# Patient Record
Sex: MALE | Race: WHITE | Employment: FULL TIME | URBAN - METROPOLITAN AREA
[De-identification: names, ages, dates, MRNs, and addresses within clinical notes are randomized per-mention and may not be internally consistent; named-entity substitution may affect disease eponyms.]

---
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
## 2019-12-12 ENCOUNTER — OFFICE VISIT (OUTPATIENT)
Dept: FAMILY MEDICINE CLINIC | Age: 19
End: 2019-12-12
Payer: COMMERCIAL

## 2019-12-12 VITALS
HEART RATE: 73 BPM | TEMPERATURE: 98.2 F | OXYGEN SATURATION: 99 % | SYSTOLIC BLOOD PRESSURE: 126 MMHG | WEIGHT: 133 LBS | DIASTOLIC BLOOD PRESSURE: 81 MMHG | HEIGHT: 71 IN | BODY MASS INDEX: 18.62 KG/M2

## 2019-12-12 DIAGNOSIS — J06.9 ACUTE URI: Primary | ICD-10-CM

## 2019-12-12 DIAGNOSIS — J20.9 ACUTE BRONCHITIS, UNSPECIFIED ORGANISM: ICD-10-CM

## 2019-12-12 LAB
INFLUENZA A ANTIBODY: NORMAL
INFLUENZA B ANTIBODY: NORMAL

## 2019-12-12 PROCEDURE — G8427 DOCREV CUR MEDS BY ELIG CLIN: HCPCS | Performed by: FAMILY MEDICINE

## 2019-12-12 PROCEDURE — G8420 CALC BMI NORM PARAMETERS: HCPCS | Performed by: FAMILY MEDICINE

## 2019-12-12 PROCEDURE — 87804 INFLUENZA ASSAY W/OPTIC: CPT | Performed by: FAMILY MEDICINE

## 2019-12-12 PROCEDURE — G8484 FLU IMMUNIZE NO ADMIN: HCPCS | Performed by: FAMILY MEDICINE

## 2019-12-12 PROCEDURE — 99213 OFFICE O/P EST LOW 20 MIN: CPT | Performed by: FAMILY MEDICINE

## 2019-12-12 PROCEDURE — 1036F TOBACCO NON-USER: CPT | Performed by: FAMILY MEDICINE

## 2019-12-12 RX ORDER — AMOXICILLIN 500 MG/1
500 TABLET, FILM COATED ORAL 3 TIMES DAILY
Qty: 30 TABLET | Refills: 0 | Status: SHIPPED | OUTPATIENT
Start: 2019-12-12 | End: 2019-12-22

## 2019-12-12 ASSESSMENT — ENCOUNTER SYMPTOMS
CHANGE IN BOWEL HABIT: 0
SHORTNESS OF BREATH: 0
ABDOMINAL PAIN: 0
RHINORRHEA: 1
DIARRHEA: 0
EYES NEGATIVE: 1
SINUS PAIN: 1
VISUAL CHANGE: 0
SORE THROAT: 1
COUGH: 1
VOMITING: 0
WHEEZING: 0
CHEST TIGHTNESS: 0
SINUS PRESSURE: 1
NAUSEA: 0

## 2020-11-06 ENCOUNTER — OFFICE VISIT (OUTPATIENT)
Dept: PRIMARY CARE CLINIC | Age: 20
End: 2020-11-06

## 2020-11-06 VITALS
HEART RATE: 94 BPM | HEIGHT: 71 IN | WEIGHT: 145 LBS | TEMPERATURE: 98.6 F | BODY MASS INDEX: 20.3 KG/M2 | OXYGEN SATURATION: 98 %

## 2020-11-06 PROCEDURE — 99214 OFFICE O/P EST MOD 30 MIN: CPT | Performed by: FAMILY MEDICINE

## 2020-11-06 RX ORDER — AMOXICILLIN AND CLAVULANATE POTASSIUM 875; 125 MG/1; MG/1
1 TABLET, FILM COATED ORAL 2 TIMES DAILY
Qty: 20 TABLET | Refills: 0 | Status: SHIPPED | OUTPATIENT
Start: 2020-11-06 | End: 2020-11-16

## 2020-11-06 ASSESSMENT — ENCOUNTER SYMPTOMS
SORE THROAT: 0
VOMITING: 0
ABDOMINAL PAIN: 0
RHINORRHEA: 0
SHORTNESS OF BREATH: 0
COUGH: 0
WHEEZING: 0
NAUSEA: 0
DIARRHEA: 0

## 2020-11-06 NOTE — PATIENT INSTRUCTIONS
Patient Education        Earache: Care Instructions  Your Care Instructions     Even though infection is a common cause of ear pain, not all ear pain means an infection. If you have ear pain and don't have an infection, it could be because of a jaw problem, such as temporomandibular joint (TMJ) pain. Or it could be because of a neck problem. When ear discomfort or pain is mild or comes and goes without other symptoms, home treatment may be all you need. Follow-up care is a key part of your treatment and safety. Be sure to make and go to all appointments, and call your doctor if you are having problems. It's also a good idea to know your test results and keep a list of the medicines you take. How can you care for yourself at home? · Apply heat on the ear to ease pain. To apply heat, put a warm water bottle, a heating pad set on low, or a warm cloth on your ear. Do not go to sleep with a heating pad on your skin. · Take an over-the-counter pain medicine, such as acetaminophen (Tylenol), ibuprofen (Advil, Motrin), or naproxen (Aleve). Be safe with medicines. Read and follow all instructions on the label. · Do not take two or more pain medicines at the same time unless the doctor told you to. Many pain medicines have acetaminophen, which is Tylenol. Too much acetaminophen (Tylenol) can be harmful. · Never insert anything, such as a cotton swab or a krishan pin, into the ear. When should you call for help? Call your doctor now or seek immediate medical care if:    · You have new or worse symptoms of infection, such as:  ? Increased pain, swelling, warmth, or redness. ? Red streaks leading from the area. ? Pus draining from the area. ? A fever. Watch closely for changes in your health, and be sure to contact your doctor if:    · You have new or worse discharge coming from the ear.     · You do not get better as expected. Where can you learn more? Go to [email or web address]://[facility]. org and sign in

## 2020-11-06 NOTE — PROGRESS NOTES
[address] CLINIC  [address] [place] [address] [place] [address]  Dept: [phone]  Dept Fax: [phone]    Javier Yanes is a 23 y.o. male who presents today for his medical conditions/complaintsas noted below. Javier Yanes is c/o of Otalgia (Left ear oain x 3 days )        HPI:     Otalgia    There is pain in the left ear. This is a new problem. The current episode started in the past 7 days (3 days). The problem occurs constantly. The problem has been unchanged. There has been no fever. The pain is moderate. Associated symptoms include ear discharge. Pertinent negatives include no abdominal pain, coughing, diarrhea, rash, rhinorrhea, sore throat or vomiting. Treatments tried: ear flush with hydrogen peroxide. The treatment provided mild relief. His past medical history is significant for hearing loss and a tympanostomy tube. recurrent ear infections   No known sick contacts    Past Medical History:   Diagnosis Date    Hearing loss     History of frequent ear infections     Past medical history reviewed and pertinent positives/negatives in the HPI    Past Surgical History:   Procedure Laterality Date    EAR EXAM UNDER GENERAL ANESTHESIA      exlploratory    MYRINGOTOMY AND TYMPANOSTOMY TUBE PLACEMENT Left     TUBES OUT NOW FAMILIA    PYLOROMYOTOMY      AS AN INFANT    TONSILLECTOMY      TYMPANOPLASTY Left 4/10/15    mastoidectomy/ossicular chain reconstruction    TYMPANOPLASTY Right 07/24/2015    Right ear Revision tympanoplasty with mastoidectomy with canalplasty with atticotomy with ossicular chain           Family History   Problem Relation Age of Onset    High Blood Pressure Mother     Diabetes Mother     High Blood Pressure Father     High Cholesterol Father     Heart Disease Father     Diabetes Father        Social History     Tobacco Use    Smoking status: Never Smoker    Smokeless tobacco: Never Used   Substance Use Topics    Alcohol use:  No Current Outpatient Medications   Medication Sig Dispense Refill    amoxicillin-clavulanate (AUGMENTIN) 875-125 MG per tablet Take 1 tablet by mouth 2 times daily for 10 days 20 tablet 0    Elastic Bandages & Supports (UNIVERSAL ARM SLING) MISC Wear daily for comfort (Patient not taking: Reported on 12/12/2019) 1 each 0    ibuprofen (ADVIL;MOTRIN) 800 MG tablet Take 1 tablet by mouth 3 times daily (with meals) (Patient not taking: Reported on 12/12/2019) 90 tablet 0     No current facility-administered medications for this visit. No Known Allergies    Health Maintenance   Topic Date Due    HPV vaccine (1 - Male 2-dose series) 05/22/2010    DTaP/Tdap/Td vaccine (6 - Tdap) 05/22/2010    Hepatitis A vaccine (2 of 2 - 2-dose series) 06/02/2011    Flu vaccine (1) 09/01/2020    Hepatitis B vaccine  Completed    Hib vaccine  Completed    Varicella vaccine  Completed    Meningococcal (ACWY) vaccine  Completed    Pneumococcal 0-64 years Vaccine  Aged Out    HIV screen  Discontinued       :      Review of Systems   Constitutional: Negative for chills and fever. HENT: Positive for ear discharge and ear pain. Negative for congestion, rhinorrhea and sore throat. Respiratory: Negative for cough, shortness of breath and wheezing. Gastrointestinal: Negative for abdominal pain, diarrhea, nausea and vomiting. Musculoskeletal: Negative for myalgias. Skin: Negative for pallor and rash. Hematological: Negative for adenopathy. Objective:   Patient was evaluated [place] today during this pandemic covid 19 situation. Physical Exam  Vitals signs and nursing note reviewed. Constitutional:       Appearance: Normal appearance. He is normal weight. HENT:      Head: Normocephalic and atraumatic. Right Ear: Hearing, ear canal and external ear normal. Drainage and swelling present. Tympanic membrane is erythematous.       Left Ear: Hearing, tympanic membrane, ear canal and external ear normal. Nose: Nose normal.      Mouth/Throat:      Lips: Pink. Mouth: Mucous membranes are moist.      Pharynx: Oropharynx is clear. Eyes:      Extraocular Movements: Extraocular movements intact. Conjunctiva/sclera: Conjunctivae normal.   Neck:      Musculoskeletal: Normal range of motion. Pulmonary:      Effort: Pulmonary effort is normal.   Skin:     General: Skin is warm and dry. Neurological:      Mental Status: He is alert and oriented to person, place, and time. Mental status is at baseline. Psychiatric:         Mood and Affect: Mood normal.         Behavior: Behavior normal.         Thought Content: Thought content normal.         Judgment: Judgment normal.       Pulse 94   Temp 98.6 °F (37 °C) (Tympanic)   Ht 5' 10.5\" (1.791 m)   Wt 145 lb (65.8 kg)   SpO2 98%   BMI 20.51 kg/m²     Assessment:       Diagnosis Orders   1. Non-recurrent acute suppurative otitis media of left ear without spontaneous rupture of tympanic membrane         Plan:    Take antibiotic as prescribed for ear infection  If symptoms worsen or do not improve please follow-up with PCP or return to clinic      No orders of the defined types were placed in this encounter. Orders Placed This Encounter   Medications    amoxicillin-clavulanate (AUGMENTIN) 875-125 MG per tablet     Sig: Take 1 tablet by mouth 2 times daily for 10 days     Dispense:  20 tablet     Refill:  0      Patient given educational materials - see patient instructions. Discussed use, benefit, and side effects of prescribed medications. All patient questions answered. Pt voiced understanding. Patient agreed with treatment plan. Follow up as directed.      Electronicallysigned by Anita Hurtado MD on 11/6/2020 at 11:06 AM

## 2021-03-18 ENCOUNTER — NURSE TRIAGE (OUTPATIENT)
Dept: OTHER | Facility: CLINIC | Age: 21
End: 2021-03-18

## 2021-03-18 NOTE — TELEPHONE ENCOUNTER
Patient called pre-service center [facility] with red flag complaint. Brief description of triage: Mattie Matos states that a couple days ago he had weakness in his left arm and some chest pains shortly after. He was vague on his reports of pain and weakness. States that he is not experiencing anything at this moment but would like to be see. Please see triage below for more detailed information. Triage indicates for patient to be seen today in the office. Discussed that if the weakness in arm or chest pain returns he should call back immediately or seek care at [facility] / ED. Care advice provided, patient verbalizes understanding; denies any other questions or concerns; instructed to call back for any new or worsening symptoms. Writer provided warm transfer to [facility] for appointment scheduling. Attention Provider: Thank you for allowing me to participate in the care of your patient. The patient was connected to triage in response to information provided to the [facility]. Please do not respond through this encounter as the response is not directed to a shared pool. Reason for Disposition   Patient wants to be seen    Answer Assessment - Initial Assessment Questions  1. SYMPTOM: \"What is the main symptom you are concerned about? \" (e.g., weakness, numbness)      Left arm weakness, hard to , heavy but not numb or tingling  Chest pains    2. ONSET: \"When did this start? \" (minutes, hours, days; while sleeping)      2 days ago    3. LAST NORMAL: \"When was the last time you were normal (no symptoms)? \"      2 days ago    4. PATTERN \"Does this come and go, or has it been constant since it started? \"  \"Is it present now? \"      Constant but does not have now. When initially happened it lasted overnight    5. CARDIAC SYMPTOMS: \"Have you had any of the following symptoms: chest pain, difficulty breathing, palpitations? \"      Chest pain a little after the arm weakness, no SOB, no palpitations    6. NEUROLOGIC SYMPTOMS: \"Have you had any of the following symptoms: headache, dizziness, vision loss, double vision, changes in speech, unsteady on your feet? \"      Denies all other symptoms    7. OTHER SYMPTOMS: \"Do you have any other symptoms? \"      No    8. PREGNANCY: \"Is there any chance you are pregnant? \" \"When was your last menstrual period? \"      NA    Protocols used: NEUROLOGIC DEFICIT-ADULT-[place]

## 2021-03-22 ENCOUNTER — HOSPITAL ENCOUNTER (OUTPATIENT)
Dept: GENERAL RADIOLOGY | Facility: CLINIC | Age: 21
Discharge: HOME | End: 2021-03-24
Payer: COMMERCIAL

## 2021-03-22 ENCOUNTER — HOSPITAL ENCOUNTER (OUTPATIENT)
Facility: CLINIC | Age: 21
Discharge: HOME | End: 2021-03-24
Payer: COMMERCIAL

## 2021-03-22 ENCOUNTER — HOSPITAL ENCOUNTER (OUTPATIENT)
Age: 21
Setting detail: SPECIMEN
Discharge: HOME | End: 2021-03-22
Payer: COMMERCIAL

## 2021-03-22 DIAGNOSIS — R00.2 PALPITATION: ICD-10-CM

## 2021-03-22 DIAGNOSIS — R29.898 LEFT ARM WEAKNESS: ICD-10-CM

## 2021-03-22 DIAGNOSIS — R07.9 CHEST PAIN, UNSPECIFIED TYPE: ICD-10-CM

## 2021-03-22 DIAGNOSIS — Z83.3 FAMILY HISTORY OF DIABETES MELLITUS (DM): ICD-10-CM

## 2021-03-22 DIAGNOSIS — Z11.59 ENCOUNTER FOR HEPATITIS C SCREENING TEST FOR LOW RISK PATIENT: ICD-10-CM

## 2021-03-22 LAB
ABSOLUTE EOS #: 0.04 K/UL (ref 0–0.44)
ABSOLUTE IMMATURE GRANULOCYTE: <0.03 K/UL (ref 0–0.3)
ABSOLUTE LYMPH #: 1.06 K/UL (ref 1.1–3.7)
ABSOLUTE MONO #: 0.55 K/UL (ref 0.1–1.4)
ALBUMIN SERPL-MCNC: 4.8 G/DL (ref 3.5–5.2)
ALBUMIN/GLOBULIN RATIO: 1.7 (ref 1–2.5)
ALP BLD-CCNC: 64 U/L (ref 40–129)
ALT SERPL-CCNC: 30 U/L (ref 5–41)
AMYLASE: 56 U/L (ref 28–100)
ANION GAP SERPL CALCULATED.3IONS-SCNC: 7 MMOL/L (ref 9–17)
AST SERPL-CCNC: 28 U/L
BASOPHILS # BLD: 0 % (ref 0–2)
BASOPHILS ABSOLUTE: <0.03 K/UL (ref 0–0.2)
BILIRUB SERPL-MCNC: 1.67 MG/DL (ref 0.3–1.2)
BUN BLDV-MCNC: 20 MG/DL (ref 6–20)
BUN/CREAT BLD: ABNORMAL (ref 9–20)
CALCIUM SERPL-MCNC: 9.7 MG/DL (ref 8.6–10.4)
CHLORIDE BLD-SCNC: 104 MMOL/L (ref 98–107)
CO2: 31 MMOL/L (ref 20–31)
CREAT SERPL-MCNC: 0.72 MG/DL (ref 0.7–1.2)
DIFFERENTIAL TYPE: ABNORMAL
EOSINOPHILS RELATIVE PERCENT: 1 % (ref 1–4)
GFR AFRICAN AMERICAN: >60 ML/MIN
GFR NON-AFRICAN AMERICAN: >60 ML/MIN
GFR SERPL CREATININE-BSD FRML MDRD: ABNORMAL ML/MIN/{1.73_M2}
GFR SERPL CREATININE-BSD FRML MDRD: ABNORMAL ML/MIN/{1.73_M2}
GLUCOSE BLD-MCNC: 88 MG/DL (ref 70–99)
HCT VFR BLD CALC: 47.7 % (ref 40.7–50.3)
HEMOGLOBIN: 15.9 G/DL (ref 13–17)
IMMATURE GRANULOCYTES: 0 %
LIPASE: 24 U/L (ref 13–60)
LYMPHOCYTES # BLD: 22 % (ref 25–45)
MCH RBC QN AUTO: 29 PG (ref 25.2–33.5)
MCHC RBC AUTO-ENTMCNC: 33.3 G/DL (ref 28.4–34.8)
MCV RBC AUTO: 86.9 FL (ref 82.6–102.9)
MONOCYTES # BLD: 12 % (ref 2–8)
MYOGLOBIN: 37 NG/ML (ref 28–72)
NRBC AUTOMATED: 0 PER 100 WBC
PDW BLD-RTO: 12.9 % (ref 11.8–14.4)
PLATELET # BLD: 213 K/UL (ref 138–453)
PLATELET ESTIMATE: ABNORMAL
PMV BLD AUTO: 11.1 FL (ref 8.1–13.5)
POTASSIUM SERPL-SCNC: 4.8 MMOL/L (ref 3.7–5.3)
RBC # BLD: 5.49 M/UL (ref 4.21–5.77)
RBC # BLD: ABNORMAL 10*6/UL
SEG NEUTROPHILS: 65 % (ref 34–64)
SEGMENTED NEUTROPHILS ABSOLUTE COUNT: 3.11 K/UL (ref 1.5–8.1)
SODIUM BLD-SCNC: 142 MMOL/L (ref 135–144)
TOTAL PROTEIN: 7.6 G/DL (ref 6.4–8.3)
TROPONIN INTERP: NORMAL
TROPONIN T: NORMAL NG/ML
TROPONIN, HIGH SENSITIVITY: 12 NG/L (ref 0–22)
WBC # BLD: 4.8 K/UL (ref 4.5–13.5)
WBC # BLD: ABNORMAL 10*3/UL

## 2021-03-22 PROCEDURE — 71046 X-RAY EXAM CHEST 2 VIEWS: CPT

## 2021-03-23 LAB
ESTIMATED AVERAGE GLUCOSE: 108 MG/DL
HBA1C MFR BLD: 5.4 % (ref 4–6)
HEPATITIS C ANTIBODY: NONREACTIVE

## 2021-09-04 ENCOUNTER — APPOINTMENT (OUTPATIENT)
Dept: GENERAL RADIOLOGY | Age: 21
End: 2021-09-04
Payer: COMMERCIAL

## 2021-09-04 ENCOUNTER — HOSPITAL ENCOUNTER (EMERGENCY)
Age: 21
Discharge: HOME | End: 2021-09-04
Attending: EMERGENCY MEDICINE
Payer: COMMERCIAL

## 2021-09-04 VITALS
SYSTOLIC BLOOD PRESSURE: 131 MMHG | WEIGHT: 180 LBS | BODY MASS INDEX: 25.77 KG/M2 | HEART RATE: 60 BPM | OXYGEN SATURATION: 100 % | TEMPERATURE: 97.6 F | RESPIRATION RATE: 16 BRPM | DIASTOLIC BLOOD PRESSURE: 80 MMHG | HEIGHT: 70 IN

## 2021-09-04 DIAGNOSIS — R07.89 ATYPICAL CHEST PAIN: Primary | ICD-10-CM

## 2021-09-04 PROCEDURE — 71046 X-RAY EXAM CHEST 2 VIEWS: CPT

## 2021-09-04 PROCEDURE — 93005 ELECTROCARDIOGRAM TRACING: CPT

## 2021-09-04 PROCEDURE — 6370000000 HC RX 637 (ALT 250 FOR IP): Performed by: STUDENT IN AN ORGANIZED HEALTH CARE EDUCATION/TRAINING PROGRAM

## 2021-09-04 PROCEDURE — 99284 EMERGENCY DEPT VISIT MOD MDM: CPT

## 2021-09-04 RX ORDER — IBUPROFEN 200 MG
400 TABLET ORAL ONCE
Status: COMPLETED | OUTPATIENT
Start: 2021-09-04 | End: 2021-09-04

## 2021-09-04 RX ADMIN — IBUPROFEN 400 MG: 200 TABLET, FILM COATED ORAL at 17:30

## 2021-09-04 ASSESSMENT — PAIN SCALES - GENERAL
PAINLEVEL_OUTOF10: 4
PAINLEVEL_OUTOF10: 0

## 2021-09-04 ASSESSMENT — ENCOUNTER SYMPTOMS
SHORTNESS OF BREATH: 1
COUGH: 0
GASTROINTESTINAL NEGATIVE: 1
CHOKING: 0
EYES NEGATIVE: 1
WHEEZING: 0
CHEST TIGHTNESS: 1
STRIDOR: 0
ALLERGIC/IMMUNOLOGIC NEGATIVE: 1

## 2021-09-04 ASSESSMENT — PAIN DESCRIPTION - LOCATION: LOCATION: CHEST

## 2021-09-04 ASSESSMENT — PAIN DESCRIPTION - PAIN TYPE: TYPE: ACUTE PAIN

## 2021-09-04 NOTE — ED NOTES
Patient discharged alert and oriented. Skin pink, warm, dry. RN discussed, educated, and counseled on care plan. Denies needs or questions at this time. States will follow up as directed. Encouraged to return for worsening or new symptoms.        Corey Martinez RN  09/04/21 [id number]

## 2021-09-04 NOTE — ED PROVIDER NOTES
[address]  Emergency Department Encounter  Emergency Medicine Resident     Pt Name: David Liriano  QGZ:[id number]  [place] 2000  Date of evaluation: 9/4/21  PCP:  Bebe Nam, CASSIDY [address]       Chief Complaint   Patient presents with    Shortness of Breath    Chest Pain       HISTORY OF PRESENT ILLNESS  (Location/Symptom, Timing/Onset, Context/Setting, Quality, Duration, ModifyingFactors, Severity.)      David Liriano is a 24 y.o. male with no significant past medical history presents to the emergency department for evaluation of 2 days of intermittent chest discomfort and shortness of breath. Patient story recounts episodes of him feeling like he cannot exhale all the way as well as instances where he feels like his epigastric region is concave in or that his ribs have overlapped. Patient states all of the symptoms have been intermittent but he feels like the symptoms are becoming worse. PAST MEDICAL / SURGICAL / SOCIAL /FAMILY HISTORY      has a past medical history of Hearing loss and History of frequent ear infections. No other pertinent PMH on review with patient/guardian. has a past surgical history that includes pyloromyotomy; Tonsillectomy; Myringotomy Tympanostomy Tube Placement (Left); Ear Exm Under Gen Anesth; Tympanoplasty (Left, 4/10/15); and Tympanoplasty (Right, 07/24/2015). No other pertinent PSH on review with patient/guardian.   Social History     Socioeconomic History    Marital status: Single     Spouse name: Not on file    Number of children: Not on file    Years of education: Not on file    Highest education level: Not on file   Occupational History    Not on file   Tobacco Use    Smoking status: Never Smoker    Smokeless tobacco: Never Used   Vaping Use    Vaping Use: Never used   Substance and Sexual Activity    Alcohol use: No    Drug use: No    Sexual activity: Not on file   Other Topics Concern    Not on file   Social History Narrative    Not on file     Social Determinants of Health     Financial Resource Strain:     Difficulty of Paying Living Expenses:    Food Insecurity:     Worried About Running Out of Food in the Last Year:     [address] in the Last Year:    Transportation Needs:     Lack of Transportation (Medical):  Lack of Transportation (Non-Medical):    Physical Activity:     Days of Exercise per Week:     Minutes of Exercise per Session:    Stress:     Feeling of Stress :    Social Connections:     Frequency of Communication with Friends and Family:     Frequency of Social Gatherings with Friends and Family:     Attends [employer] Services:     Active Member of Clubs or Organizations:     Attends Club or Organization Meetings:     Marital Status:    Intimate Partner Violence:     Fear of Current or Ex-Partner:     Emotionally Abused:     Physically Abused:     Sexually Abused:        I counseled the patient against using tobacco products. Family History   Problem Relation Age of Onset    High Blood Pressure Mother     Diabetes Mother     High Blood Pressure Father     High Cholesterol Father     Heart Disease Father     Diabetes Father      No other pertinent FamHx on review with patient/guardian. Allergies:  Patient has no known allergies. Home Medications:  Prior to Admission medications    Medication Sig Start Date End Date Taking? Authorizing Provider   Elastic Bandages & Supports (UNIVERSAL ARM SLING) MISC Wear daily for comfort 11/13/18   Zaynab Covington, APRN - CNP   ibuprofen (ADVIL;MOTRIN) 800 MG tablet Take 1 tablet by mouth 3 times daily (with meals) 11/13/18   Zaynab Covington, APRN - CNP       REVIEW OF SYSTEMS    (2-9 systems for level 4, 10 ormore for level 5)      Review of Systems   Constitutional: Negative. HENT: Negative. Eyes: Negative. Respiratory: Positive for chest tightness and shortness of breath.  Negative for cough, choking, wheezing and stridor. Cardiovascular: Positive for chest pain. Negative for palpitations and leg swelling. Gastrointestinal: Negative. Endocrine: Negative. Genitourinary: Negative. Musculoskeletal: Negative. Allergic/Immunologic: Negative. Psychiatric/Behavioral: Negative. PHYSICAL EXAM   (up to 7 for level 4, 8 or more for level 5)      INITIAL VITALS:   /80   Pulse 60   Temp 97.6 °F (36.4 °C) (Oral)   Resp 16   Ht 5' 10\" (1.778 m)   Wt 180 lb (81.6 kg)   SpO2 100%   BMI 25.83 kg/m²     Physical Exam  Constitutional:       General: He is not in acute distress. Appearance: He is well-developed. He is not ill-appearing. HENT:      Head: Normocephalic and atraumatic. Mouth/Throat:      Mouth: Mucous membranes are moist.      Pharynx: Oropharynx is clear. Eyes:      Extraocular Movements: Extraocular movements intact. Pupils: Pupils are equal, round, and reactive to light. Cardiovascular:      Rate and Rhythm: Normal rate and regular rhythm. Pulmonary:      Effort: Pulmonary effort is normal.      Breath sounds: Normal breath sounds. No decreased breath sounds, wheezing, rhonchi or rales. Chest:      Chest wall: No mass or tenderness. Abdominal:      Palpations: Abdomen is soft. Musculoskeletal:         General: Normal range of motion. Skin:     General: Skin is warm. Capillary Refill: Capillary refill takes less than 2 seconds. Neurological:      General: No focal deficit present. Mental Status: He is alert. He is disoriented.    Psychiatric:         Mood and Affect: Mood normal.         DIFFERENTIAL  DIAGNOSIS     DDX: Atypical pneumonia, costochondritis, ACS    PLAN (LABS / IMAGING / EKG):  Orders Placed This Encounter   Procedures    XR CHEST (2 VW)    EKG 12 Lead       MEDICATIONS ORDERED:  Orders Placed This Encounter   Medications    ibuprofen (ADVIL;MOTRIN) tablet 400 mg           DIAGNOSTIC RESULTS / EMERGENCY DEPARTMENT COURSE / MDM LABS:  No results found for this visit on 09/04/21. IMPRESSION/MDM/ED COURSE:  24 y.o. male presented with intermittent shortness of breath as well as intermittent chest type pain/discomfort. Patient's exam is reassuring. Equal air entry and exit bilaterally, no focal area of pain or fremitus. Will obtain EKG and a chest x-ray to rule out ACS or pneumonia. Will treat attempted to symptomatically with Motrin and reassess. ED Course as of Sep 04 1814   Sat Sep 04, 2021   1804 Patient's EKG and chest x-ray negative. [ES]   [id number] Spoke with patient at length at bedside. Stated that we had no acute findings on EKG or chest x-ray and the most appropriate course of action would to be follow-up with his primary care physician. Both patient and patient's mother expressed understanding, states that he does have a remote history of having GERD which he did take an acids before prior. States that the pain might be consistent with worsening of his symptoms as he is not been taking his medications for some time. Recommended again following up with primary care for reevaluation of both old and new symptoms. .    [ES]      ED Course User Index  [ES] Leanna Ayala MD       Patient/Guardian requesting discharge. Patient/Guardian was given written and verbal instructions prior to discharge. Patient/Guardian understood and agreed. Patient/Guardian had no further questions. RADIOLOGY:  XR CHEST (2 VW)   Final Result   Clear lungs. No acute cardiopulmonary abnormality. EKG  None    All EKG's are interpreted by the Emergency Department Physician who either signs or Co-signs this chart in the absence of a cardiologist.      PROCEDURES:  None    CONSULTS:  None        FINAL IMPRESSION      1.  Atypical chest pain          DISPOSITION / PLAN     DISPOSITION        PATIENT REFERREDTO:  Julio Nolasco, APRN - CNP  [address] [facility]  [address] [facility],[address]  [address] [place] [id number] [phone]    Schedule an appointment as soon as possible for a visit       [facility]  [address]  [phone]    As needed, If symptoms worsen      DISCHARGE MEDICATIONS:  New Prescriptions    No medications on file       Kaveh Duran MD  PGY 2  Resident Physician Emergency Medicine  09/04/21 6:14 PM        (Please note that portions of this note were completed with a voice recognition program.Efforts were made to edit the dictations but occasionally words are mis-transcribed.)        Kaveh Duran MD  Resident  09/04/21 0020

## 2021-09-04 NOTE — ED PROVIDER NOTES
EMERGENCY DEPARTMENT ENCOUNTER   ATTENDING ATTESTATION     Pt Name: Racheal Carrera  MRN: [id number]  [place] 2000  Date of evaluation: 9/4/21       Racheal Carrera is a 24 y.o. male who presents with Shortness of Breath and Chest Pain      MDM:         PERC Rule Negative  Age < 50 years  Pulse < 100 bpm  SaO2 > 94%  No unilateral leg swelling  No hemoptysis  No recent trauma or surgery  No prior PE or DVT  No hormone use      Do not suspect AMI or ACS or AD or PE or sepsis or cholecystitis    abd soft nt    Having some intermittent right low rib discomfort off and on for months  Takes meds for GERD  Normal ekg  Normal CXR      Vitals:   Vitals:    09/04/21 1507   BP: 131/80   Pulse: 60   Resp: 16   Temp: 97.6 °F (36.4 °C)   TempSrc: Oral   SpO2: 100%   Weight: 180 lb (81.6 kg)   Height: 5' 10\" (1.778 m)         I personally evaluated and examined the patient in conjunction with the resident and agree with the assessment, treatment plan, and disposition of the patient as recorded by the resident. I performed a history and physical examination of the patient and discussed management with the resident. I reviewed the residents note and agree with the documented findings and plan of care. Any areas of disagreement are noted on the chart. I was personally present for the key portions of any procedures. I have documented in the chart those procedures where I was not present during the key portions. I have personally reviewed all images and agree with the resident's interpretation. I have reviewed the emergency nurses triage note. I agree with the chief complaint, past medical history, past surgical history, allergies, medications, social and family history as documented unless otherwise noted.     Hugo Recio MD  Attending Emergency Physician            Hugo Recio MD  09/04/21 1766

## 2021-09-26 LAB
EKG ATRIAL RATE: 55 BPM
EKG P AXIS: 36 DEGREES
EKG P-R INTERVAL: 142 MS
EKG Q-T INTERVAL: 414 MS
EKG QRS DURATION: 98 MS
EKG QTC CALCULATION (BAZETT): 396 MS
EKG R AXIS: 17 DEGREES
EKG VENTRICULAR RATE: 55 BPM